# Patient Record
Sex: FEMALE | Race: OTHER | HISPANIC OR LATINO
[De-identification: names, ages, dates, MRNs, and addresses within clinical notes are randomized per-mention and may not be internally consistent; named-entity substitution may affect disease eponyms.]

---

## 2020-11-02 ENCOUNTER — ASOB RESULT (OUTPATIENT)
Age: 25
End: 2020-11-02

## 2020-11-02 ENCOUNTER — APPOINTMENT (OUTPATIENT)
Dept: ANTEPARTUM | Facility: CLINIC | Age: 25
End: 2020-11-02
Payer: MEDICAID

## 2020-11-02 PROBLEM — Z00.00 ENCOUNTER FOR PREVENTIVE HEALTH EXAMINATION: Status: ACTIVE | Noted: 2020-11-02

## 2020-11-02 PROCEDURE — 76801 OB US < 14 WKS SINGLE FETUS: CPT | Mod: 59

## 2020-11-06 ENCOUNTER — EMERGENCY (EMERGENCY)
Facility: HOSPITAL | Age: 25
LOS: 1 days | Discharge: HOME | End: 2020-11-06
Attending: EMERGENCY MEDICINE
Payer: MEDICAID

## 2020-11-06 VITALS
WEIGHT: 149.91 LBS | HEART RATE: 105 BPM | SYSTOLIC BLOOD PRESSURE: 113 MMHG | RESPIRATION RATE: 18 BRPM | HEIGHT: 72 IN | OXYGEN SATURATION: 98 % | DIASTOLIC BLOOD PRESSURE: 71 MMHG | TEMPERATURE: 99 F

## 2020-11-06 VITALS
RESPIRATION RATE: 20 BRPM | DIASTOLIC BLOOD PRESSURE: 74 MMHG | SYSTOLIC BLOOD PRESSURE: 110 MMHG | TEMPERATURE: 98 F | OXYGEN SATURATION: 97 % | HEART RATE: 98 BPM | HEIGHT: 72 IN

## 2020-11-06 LAB — S PYO DNA THROAT QL NAA+PROBE: DETECTED

## 2020-11-06 PROCEDURE — 87798 DETECT AGENT NOS DNA AMP: CPT

## 2020-11-06 PROCEDURE — 99283 EMERGENCY DEPT VISIT LOW MDM: CPT

## 2020-11-06 PROCEDURE — T1013: CPT

## 2020-11-06 PROCEDURE — 99284 EMERGENCY DEPT VISIT MOD MDM: CPT

## 2020-11-06 PROCEDURE — 87651 STREP A DNA AMP PROBE: CPT

## 2020-11-06 RX ORDER — ACETAMINOPHEN 500 MG
15 TABLET ORAL
Qty: 180 | Refills: 0
Start: 2020-11-06 | End: 2020-11-08

## 2020-11-06 RX ORDER — AMOXICILLIN 250 MG/5ML
875 SUSPENSION, RECONSTITUTED, ORAL (ML) ORAL ONCE
Refills: 0 | Status: COMPLETED | OUTPATIENT
Start: 2020-11-06 | End: 2020-11-06

## 2020-11-06 RX ORDER — ACETAMINOPHEN 500 MG
650 TABLET ORAL ONCE
Refills: 0 | Status: COMPLETED | OUTPATIENT
Start: 2020-11-06 | End: 2020-11-06

## 2020-11-06 RX ORDER — AMOXICILLIN 250 MG/5ML
1 SUSPENSION, RECONSTITUTED, ORAL (ML) ORAL
Qty: 20 | Refills: 0
Start: 2020-11-06 | End: 2020-11-15

## 2020-11-06 RX ORDER — AMOXICILLIN 250 MG/5ML
11 SUSPENSION, RECONSTITUTED, ORAL (ML) ORAL
Qty: 220 | Refills: 0
Start: 2020-11-06 | End: 2020-11-15

## 2020-11-06 RX ADMIN — Medication 875 MILLIGRAM(S): at 21:20

## 2020-11-06 RX ADMIN — Medication 650 MILLIGRAM(S): at 20:41

## 2020-11-06 NOTE — ED ADULT NURSE NOTE - NSIMPLEMENTINTERV_GEN_ALL_ED
stated Implemented All Universal Safety Interventions:  [place] to call system. Call bell, personal items and telephone within reach. Instruct patient to call for assistance. Room bathroom lighting operational. Non-slip footwear when patient is off stretcher. Physically safe environment: no spills, clutter or unnecessary equipment. Stretcher in lowest position, wheels locked, appropriate side rails in place.

## 2020-11-06 NOTE — ED STATDOCS - PATIENT PORTAL LINK FT
You can access the FollowMyHealth Patient Portal offered by [facility] by registering at the following website: [email or web address]. By joining [facility]’s FollowMyHealth portal, you will also be able to view your health information using other applications (apps) compatible with our system.

## 2020-11-06 NOTE — ED PROVIDER NOTE - NSFOLLOWUPINSTRUCTIONS_ED_ALL_ED_FT
Faringitis    La faringitis es la inflamación de la faringe, que suele ser causada por pedro infección viral o bacteriana. La faringitis puede ser contagiosa y puede propagarse de persona a persona a través del contacto íntimo, toser, estornudar o compartir artículos y utensilios personales. Los síntomas de la faringitis pueden incluir dolor de garganta, fiebre, dolor de nai o inflamación de los ganglios linfáticos. Si te recetan antibióticos, asegúrate de terminarlos incluso si empiezas a sentirte mejor. Haz gárgaras con agua salada tan a menudo blank cada 1-2 horas para calmar la garganta. Las pastillas para la garganta (si usted no está en riesgo de asfixia) o aerosoles se pueden utilizar para calmar la garganta.    BUSCA CUIDADO MEDICAL INMEDIATO SI TIENE CUALQUIERA DE LOS SIGUIENTES SYMPTOMS: rigidez del parvez, babeo, ronquera o cambio en la voz, incapacidad para tragar líquidos, vómitos o dificultad para respirar.    Curso completo completo de antibióticos según lo prescrito    James un seguimiento con lee médico principal en un plazo de 48 horas

## 2020-11-06 NOTE — ED ADULT NURSE NOTE - OBJECTIVE STATEMENT
c/o sore throat times three days, went to clinic no answers, presents with redness to the throat and pain with swallowing

## 2020-11-06 NOTE — ED ADULT TRIAGE NOTE - CHIEF COMPLAINT QUOTE
patient was seen here earlier today, is 11 weeks pregnant throat pain with left ear pain was given ABX still feeling the same

## 2020-11-06 NOTE — ED PROVIDER NOTE - PATIENT PORTAL LINK FT
You can access the FollowMyHealth Patient Portal offered by [facility] by registering at the following website: [email or web address]. By joining [facility]’s FollowMyHealth portal, you will also be able to view your health information using other applications (apps) compatible with our system.

## 2020-11-06 NOTE — ED STATDOCS - NSFOLLOWUPINSTRUCTIONS_ED_ALL_ED_FT
Tylenol extra strength 2 tablets every 4 hours for aches, pains, fevers or chills.  Keep well hydrated: drink lots of fluids including orange juice, tea with honey/ lemon tracey, and chicken broth. Rest.  Return immediately to the ER for re-examination if your symptoms are worsening. Otherwise, follow-up with your doctor in 2-3 days for re-evaluation.

## 2020-11-06 NOTE — ED PROVIDER NOTE - CLINICAL SUMMARY MEDICAL DECISION MAKING FREE TEXT BOX
25f with strep pharyngitis c/o throat pain. Pt started taking amoxicillin today and reports no improvement in symptoms. Uvula midline. Pt tolerating liquid PO intake. VSS. Will treat with PO liquid tylenol. Advised to complete full course of amoxicillin and follow up with PCP within 48 hours. Educated to return to ED if she is unable to tolerate PO intake, starts drooling, or develops difficulty breathing.

## 2020-11-06 NOTE — ED STATDOCS - OBJECTIVE STATEMENT
24 y/o female with PMHx of depression presents to ED c/o sore throat. Patient states 3 days with constant throat pain and right ear pain. Patient also c/o ha, and feels like vomiting when swallow. Patient states she felt warm last night. Denies: cough, abdominal pain, diarrhea, lost of taste or smell. 26 y/o female with PMHx of depression presents to ED c/o sore throat. Patient states 3 days with constant throat pain and right ear pain. Patient also c/o ha, and feels like vomiting when swallow. Patient states she felt warm last night. Denies: cough, abdominal pain, diarrhea, lost of taste or smell.  : Keisha 26 y/o female with PMHx of depression presents to ED c/o sore throat. Patient states 3 days with constant throat pain, difficulty swallowing, headache and right ear pain. Reports tactile fever last night. Denies: cough, abdominal pain, diarrhea, lost of taste or smell.  : Keisha

## 2020-11-06 NOTE — ED PROVIDER NOTE - OBJECTIVE STATEMENT
Pt is a 24 y/o f, denies PMH, currently 11 weeks gestation, presents to ED c/o sore throat x 3 days. Pt states she has been having pain with swallowing for the past 3 days that has persistently worsened. Pt was seen in [facility] ED earlier today and diagnosed with Strep pharyngitis and started on amoxicillin. Pt states she has been able to tolerate liquid PO intake but has severe pain with trying to tolerate solid food. Pt has been taking Tylenol with minimal relief of symptoms ( last dose 6:00am ). Pt has no other complaints at this time. Denies fevers, chills, belly pain, HA, dizziness, SOB, wheezing, urinary sx, sick contacts, and recent travel.  ED : Teresa Pt is a 26 y/o F, denies PMH, currently 11 weeks gestation, presents to ED c/o sore throat x 3 days. Pt states she has been having pain with swallowing for the past 3 days that has persistently worsened. Pt was seen in [facility] ED earlier today and diagnosed with Strep pharyngitis and started on amoxicillin. Pt states she has been able to tolerate liquid PO intake but has severe pain with trying to tolerate solid food. Pt has been taking Tylenol with minimal relief of symptoms ( last dose 6:00am ). Pt has no other complaints at this time. Denies fevers, chills, belly pain, HA, dizziness, SOB, wheezing, urinary sx, sick contacts, and recent travel.  ED : Teresa

## 2020-11-06 NOTE — ED STATDOCS - CLINICAL SUMMARY MEDICAL DECISION MAKING FREE TEXT BOX
pharyngitis, anticipatory guidance provided and supportive care. pharyngitis, rapid strep sent, anticipatory guidance provided and supportive care.

## 2021-01-06 ENCOUNTER — ASOB RESULT (OUTPATIENT)
Age: 26
End: 2021-01-06

## 2021-01-06 ENCOUNTER — APPOINTMENT (OUTPATIENT)
Dept: ANTEPARTUM | Facility: CLINIC | Age: 26
End: 2021-01-06
Payer: MEDICAID

## 2021-01-06 PROCEDURE — 76811 OB US DETAILED SNGL FETUS: CPT

## 2021-01-06 PROCEDURE — 99072 ADDL SUPL MATRL&STAF TM PHE: CPT

## 2021-03-17 ENCOUNTER — APPOINTMENT (OUTPATIENT)
Dept: ANTEPARTUM | Facility: CLINIC | Age: 26
End: 2021-03-17
Payer: MEDICAID

## 2021-03-17 ENCOUNTER — ASOB RESULT (OUTPATIENT)
Age: 26
End: 2021-03-17

## 2021-03-17 PROCEDURE — 76816 OB US FOLLOW-UP PER FETUS: CPT

## 2021-03-17 PROCEDURE — 99072 ADDL SUPL MATRL&STAF TM PHE: CPT

## 2021-04-28 ENCOUNTER — APPOINTMENT (OUTPATIENT)
Dept: ANTEPARTUM | Facility: CLINIC | Age: 26
End: 2021-04-28
Payer: MEDICAID

## 2021-04-28 ENCOUNTER — ASOB RESULT (OUTPATIENT)
Age: 26
End: 2021-04-28

## 2021-04-28 PROCEDURE — 99072 ADDL SUPL MATRL&STAF TM PHE: CPT

## 2021-04-28 PROCEDURE — 76819 FETAL BIOPHYS PROFIL W/O NST: CPT

## 2021-04-28 PROCEDURE — 76816 OB US FOLLOW-UP PER FETUS: CPT

## 2021-05-28 ENCOUNTER — INPATIENT (INPATIENT)
Facility: HOSPITAL | Age: 26
LOS: 2 days | Discharge: HOME | End: 2021-05-31
Attending: OBSTETRICS & GYNECOLOGY | Admitting: OBSTETRICS & GYNECOLOGY
Payer: MEDICAID

## 2021-05-28 VITALS
TEMPERATURE: 99 F | RESPIRATION RATE: 17 BRPM | WEIGHT: 210.1 LBS | HEART RATE: 109 BPM | SYSTOLIC BLOOD PRESSURE: 122 MMHG | DIASTOLIC BLOOD PRESSURE: 88 MMHG | HEIGHT: 63 IN

## 2021-05-28 DIAGNOSIS — O26.893 OTHER SPECIFIED PREGNANCY RELATED CONDITIONS, THIRD TRIMESTER: ICD-10-CM

## 2021-05-28 DIAGNOSIS — Z3A.40 40 WEEKS GESTATION OF PREGNANCY: ICD-10-CM

## 2021-05-28 LAB
BASOPHILS # BLD AUTO: 0.04 K/UL — SIGNIFICANT CHANGE UP (ref 0–0.2)
BASOPHILS NFR BLD AUTO: 0.3 % — SIGNIFICANT CHANGE UP (ref 0–2)
BLD GP AB SCN SERPL QL: SIGNIFICANT CHANGE UP
EOSINOPHIL # BLD AUTO: 0.13 K/UL — SIGNIFICANT CHANGE UP (ref 0–0.5)
EOSINOPHIL NFR BLD AUTO: 1.1 % — SIGNIFICANT CHANGE UP (ref 0–6)
HCT VFR BLD CALC: 40.4 % — SIGNIFICANT CHANGE UP (ref 34.5–45)
HGB BLD-MCNC: 14.1 G/DL — SIGNIFICANT CHANGE UP (ref 11.5–15.5)
IMM GRANULOCYTES NFR BLD AUTO: 0.5 % — SIGNIFICANT CHANGE UP (ref 0–1.5)
LYMPHOCYTES # BLD AUTO: 2.81 K/UL — SIGNIFICANT CHANGE UP (ref 1–3.3)
LYMPHOCYTES # BLD AUTO: 23.5 % — SIGNIFICANT CHANGE UP (ref 13–44)
MCHC RBC-ENTMCNC: 32.2 PG — SIGNIFICANT CHANGE UP (ref 27–34)
MCHC RBC-ENTMCNC: 34.9 GM/DL — SIGNIFICANT CHANGE UP (ref 32–36)
MCV RBC AUTO: 92.2 FL — SIGNIFICANT CHANGE UP (ref 80–100)
MONOCYTES # BLD AUTO: 0.6 K/UL — SIGNIFICANT CHANGE UP (ref 0–0.9)
MONOCYTES NFR BLD AUTO: 5 % — SIGNIFICANT CHANGE UP (ref 2–14)
NEUTROPHILS # BLD AUTO: 8.34 K/UL — HIGH (ref 1.8–7.4)
NEUTROPHILS NFR BLD AUTO: 69.6 % — SIGNIFICANT CHANGE UP (ref 43–77)
PLATELET # BLD AUTO: 264 K/UL — SIGNIFICANT CHANGE UP (ref 150–400)
RBC # BLD: 4.38 M/UL — SIGNIFICANT CHANGE UP (ref 3.8–5.2)
RBC # FLD: 12.6 % — SIGNIFICANT CHANGE UP (ref 10.3–14.5)
SARS-COV-2 RNA SPEC QL NAA+PROBE: SIGNIFICANT CHANGE UP
WBC # BLD: 11.98 K/UL — HIGH (ref 3.8–10.5)
WBC # FLD AUTO: 11.98 K/UL — HIGH (ref 3.8–10.5)

## 2021-05-28 RX ORDER — SODIUM CHLORIDE 9 MG/ML
1000 INJECTION, SOLUTION INTRAVENOUS
Refills: 0 | Status: DISCONTINUED | OUTPATIENT
Start: 2021-05-28 | End: 2021-05-29

## 2021-05-28 RX ORDER — OXYTOCIN 10 UNIT/ML
333.33 VIAL (ML) INJECTION
Qty: 20 | Refills: 0 | Status: DISCONTINUED | OUTPATIENT
Start: 2021-05-28 | End: 2021-05-31

## 2021-05-28 RX ORDER — CITRIC ACID/SODIUM CITRATE 300-500 MG
30 SOLUTION, ORAL ORAL ONCE
Refills: 0 | Status: DISCONTINUED | OUTPATIENT
Start: 2021-05-28 | End: 2021-05-29

## 2021-05-28 RX ORDER — OXYTOCIN 10 UNIT/ML
2 VIAL (ML) INJECTION
Qty: 30 | Refills: 0 | Status: DISCONTINUED | OUTPATIENT
Start: 2021-05-28 | End: 2021-05-31

## 2021-05-28 RX ADMIN — Medication 2 MILLIUNIT(S)/MIN: at 22:35

## 2021-05-28 RX ADMIN — SODIUM CHLORIDE 125 MILLILITER(S): 9 INJECTION, SOLUTION INTRAVENOUS at 18:50

## 2021-05-28 NOTE — OB PROVIDER H&P - PMH
Depression    Termination of pregnancy (fetus)  x 1 (2016)  Vaginal delivery  x3 (2011, 2014, 2016)

## 2021-05-28 NOTE — OB PROVIDER H&P - NSLASTDATEOFPRENATALVISIT_OBGYN_ALL_OB_DT
CHIEF COMPLAINT    Chief Complaint   Patient presents with   • Back Pain       HPI    5:50 PM Maggy Lake is a 24 year old female with hx chronic back pain presents to the ED c/o acute worsening of her chronic lower back pain with radiates down her left leg x3 days.  Patient doesn't remember a specific event that caused her pain to increase. Pt does see Dr. Foreman, PM, for her back pain. She gets injections for the pain and her last injection was in the fall. She states it has been a long time since it has been this bad. She has tried Tylenol, Ibuprofen 800mg and Tizanidine with minimal relief. She denies bowel or bladder incontinence. She denies a hx DM. She has no other concerns at this time.     Allergies    ALLERGIES:   Allergen Reactions   • Risperidone Other (See Comments)     galactorrhea   • Tramadol PRURITUS       Current Medications   Discharge Medication List as of 5/14/2017  5:58 PM      CONTINUE these medications which have NOT CHANGED    Details   tiZANidine (ZANAFLEX) 4 MG tablet Take 4 mg by mouth every 6 hours as needed.Historical Med      ibuprofen (MOTRIN) 800 MG tablet Take 1 tablet by mouth 3 times daily as needed for Pain.Eprescribe, Disp-30 tablet, R-0      acetaminophen (TYLENOL) 325 MG tablet Take 325 mg by mouth every 4 hours as needed for Pain.Historical Med      HYDROcodone-acetaminophen (NORCO) 5-325 MG per tablet Take 1 tablet by mouth every 6 hours as needed for Pain.Normal, Disp-15 tablet, R-0      Lurasidone HCl (LATUDA) 60 MG Tab Take 60 mg by mouth daily.Eprescribe, Disp-30 tablet, R-1      albuterol 108 (90 BASE) MCG/ACT inhaler Inhale 1 puff into the lungs every 4 hours as needed for Wheezing.Eprescribe, Disp-1 Inhaler, R-12             Past Medical History    Past Medical History:   Diagnosis Date   • Anxiety    • Bipolar affective disorder (CMS/HCC)    • Depression    • HPV in female    • Obesity    • RAD (reactive airway disease)        Surgical History    Past Surgical  History:   Procedure Laterality Date   • D AND C  2010   • LAPAROSCOPIC SALPINGOSTOMY Bilateral 12/20/2016   • PILONIDAL CYST DRAINAGE  ~2012   • URETER SURGERY      x3+ times       Social History    Social History     Social History   • Marital status: Single     Spouse name: J Carlos Wang   • Number of children: 3   • Years of education: N/A     Occupational History   • Cleaning jobs x 2      homes/businesses     Social History Main Topics   • Smoking status: Current Every Day Smoker     Packs/day: 0.25     Years: 5.00     Types: Cigarettes   • Smokeless tobacco: Never Used      Comment: Refuses quitline 9-21-16   • Alcohol use No   • Drug use: No   • Sexual activity: No     Other Topics Concern   • Caffeine Concern No     1 cup coffee daily   • Exercise No     Not Regular     Social History Narrative       Family History    Family History   Problem Relation Age of Onset   • Diabetes Maternal Grandmother        REVIEW OF SYSTEMS    ALL 13 SYSTEMS REVIEWED AND NEGATIVE OR NONCONTRIBUTORY UNLESS OTHERWISE NOTED IN HPI      PHYSICAL EXAM       ED Triage Vitals   ED Triage Vitals Group      Temp 05/14/17 1732 99.2 °F (37.3 °C)      Pulse 05/14/17 1732 96      Resp 05/14/17 1732 18      BP 05/14/17 1732 121/75      SpO2 05/14/17 1732 98 %      EtCO2 mmHg --       Height 05/14/17 1732 5' 9\" (1.753 m)      Weight 05/14/17 1732 270 lb (122.5 kg)      Weight Scale Used --      Gen:   AAOx3 in NAD.  Moderate distress.  Head:  Normocephalic, without abnormal findings  HEENT:   PERRL,   Neck: No masses, thyromegaly, posterior tenderness or meningeal signs  Resp: No respiratory distress  ABD: Nontender, No masses or organomegaly  Back: No CVA tenderness, deformities, swelling or ecchymosis.    TTP to left lower lumbar with muscle spasm. No midline tenderness  Ext:  No clubbing, cyanosis, or significant edema.  Equal UE/LE pulses. No joint swelling or erythema  Skin:  Well perfused, no significant rashes. No jaundice  Neuro:  No focal Neuro deficits with equal UE/LE strength and sensation.  DTR 2+ and equal rian.  Normal dorsiflexion/plantar flexion with normal EHL/FHL strength.  No sensory deficit.  Lymph:No significant Lymphadenopathy  Psych: Alert and interactive with normal affect and interaction.          Procedures      MDM    Upon initial visit we discussed the plan for an injection for pain control. We also discussed the plan for discharge with prescriptions for diclofenac and a medrol dosepak. She is to call Dr. Foreman's office in the morning and follow up with him. She agrees with this plan. All questions and concerns were addressed at this time.     Medications   HYDROmorphone (DILAUDID) PF injection 1 mg (1 mg Intramuscular Given 5/14/17 1810)   ketorolac injection 60 mg (60 mg Intramuscular Given 5/14/17 1809)   HYDROmorphone (DILAUDID) PF injection 1 mg (1 mg Intramuscular Given 5/14/17 1920)         Diagnosis:  ED Diagnosis        Final diagnosis    Left lumbar radiculopathy             Discharge Medication List as of 5/14/2017  5:58 PM      START taking these medications    Details   methylPREDNISolone (MEDROL DOSEPAK) 4 MG tablet follow package directionsNormal, Disp-21 tablet, R-0      diclofenac (VOLTAREN) 75 MG EC tablet Take 1 tablet by mouth 2 times daily.Normal, Disp-20 tablet, R-0             Follow Up:  Lucio Foreman MD  [address] [address]  [phone]    In 1 day       Patient was instructed to return to the ED immediately if symptoms worsen or any new unusual symptoms arise.     Manolo Burleson MD     Recheck on patient. Discussed with patient ED findings and plan for discharge. Patient was given ED warnings, discharge instructions, and follow up information to go home with. Patient understands and agrees with plan for discharge. Any questions have been answered.      Closure:  The patient understands that this is a provisional diagnosis. Provisional diagnosis can and do change.  The diagnosis that you are discharged with today is based on the symptoms with which you presented today. If any new symptoms occur or worsen, you should seek immediate attention for re-evaluation.  Any symptoms that persist or fail to completely resolve require further evaluation by your other healthcare provider(s).      This chart was documented by Arminda Das acting as a scribe for Manolo Burleson MD, 5/14/2017, 5:49 PM.  The documentation recorded by the scribe accurately and completely reflects the service(s) I personally performed and the decisions made by me.          Manolo Burleson MD  05/14/17 5910     28-May-2021

## 2021-05-28 NOTE — OB PROVIDER H&P - NSHPPHYSICALEXAM_GEN_ALL_CORE
Vital Signs Last 24 Hrs  T(C): 37 (28 May 2021 18:18), Max: 37 (28 May 2021 18:18)  T(F): 98.6 (28 May 2021 18:18), Max: 98.6 (28 May 2021 18:18)  HR: 109 (28 May 2021 18:25) (109 - 109)  BP: 122/88 (28 May 2021 18:25) (122/88 - 122/88)  RR: 17 (28 May 2021 18:18) (17 - 17)    CV: RRR   Lungs: CTA   Abdomen: gravid, soft, nontender

## 2021-05-28 NOTE — OB RN PATIENT PROFILE - PMH
Depression    Vaginal delivery  x3 (2011, 2014, 2016)   Depression    Termination of pregnancy (fetus)  x 1 (2016)  Vaginal delivery  x3 (2011, 2014, 2016)

## 2021-05-28 NOTE — OB PROVIDER H&P - HISTORY OF PRESENT ILLNESS
Patient is a 24yo  at 40 5/7 weeks consistent with TORO 2021 who was sent to L&D for nonreactive NST in [facility] clinic today (2 variable decelerations, moderate variability, and accelerations were present). She had intermittent contractions. No leakage of fluid or vaginal bleeding. +FM     Prenatal course otherwise uncomplicated. Desires postpartum sterilization     OBHx:   G1   7lbs   G2   8lbs   G3 2016  8lbs 2oz   G4 SAB   G5 current

## 2021-05-28 NOTE — OB PROVIDER H&P - ASSESSMENT
Patient is a 26yo  at 40 5/7 weeks consistent with TORO 2021 who was sent to L&D for IOL for nonreactive NST     Plan:   [] FHT category 1   [] Ctx irregular   [] GBS negative   [] Admission labs pending, including COVID swab   [] Will reexamine and determine IOL method, likely AROM +/- pitocin

## 2021-05-28 NOTE — OB PROVIDER LABOR PROGRESS NOTE - ASSESSMENT
Jeffry is a 24yo  at 40 weeks     Plan:   [] FHT category 1   [] ctx irregular   [] Cervix unchanged despite AROM and more regular contractions Will start pitocin.     Dr. Simmons in agreement with plan 
Jeffry is a 24yo  at 40+ weeks here for IOL for nonreactive NST     [] FHT category 1   [] ctx irregular   [] AROM clear fluid   [] Will augment with pitocin if ctx still inadequate after AROM     Dr. Simmons in agreement with plan

## 2021-05-28 NOTE — OB RN PATIENT PROFILE - NS PRO DEPRESSION SCREENING Y/N1
Assumed care of patient at 1900.  used for assessment. A/O x4, denies pain or needs at this time. Discussed plan of care and call light use.    no

## 2021-05-29 ENCOUNTER — TRANSCRIPTION ENCOUNTER (OUTPATIENT)
Age: 26
End: 2021-05-29

## 2021-05-29 LAB
BASOPHILS # BLD AUTO: 0.04 K/UL — SIGNIFICANT CHANGE UP (ref 0–0.2)
BASOPHILS NFR BLD AUTO: 0.3 % — SIGNIFICANT CHANGE UP (ref 0–2)
COVID-19 SPIKE DOMAIN AB INTERP: POSITIVE
COVID-19 SPIKE DOMAIN AB INTERP: POSITIVE
COVID-19 SPIKE DOMAIN ANTIBODY RESULT: 114 U/ML — HIGH
COVID-19 SPIKE DOMAIN ANTIBODY RESULT: 133 U/ML — HIGH
EOSINOPHIL # BLD AUTO: 0.02 K/UL — SIGNIFICANT CHANGE UP (ref 0–0.5)
EOSINOPHIL NFR BLD AUTO: 0.1 % — SIGNIFICANT CHANGE UP (ref 0–6)
HCT VFR BLD CALC: 34.4 % — LOW (ref 34.5–45)
HGB BLD-MCNC: 12 G/DL — SIGNIFICANT CHANGE UP (ref 11.5–15.5)
IMM GRANULOCYTES NFR BLD AUTO: 0.4 % — SIGNIFICANT CHANGE UP (ref 0–1.5)
LYMPHOCYTES # BLD AUTO: 13.4 % — SIGNIFICANT CHANGE UP (ref 13–44)
LYMPHOCYTES # BLD AUTO: 2.12 K/UL — SIGNIFICANT CHANGE UP (ref 1–3.3)
MCHC RBC-ENTMCNC: 32.5 PG — SIGNIFICANT CHANGE UP (ref 27–34)
MCHC RBC-ENTMCNC: 34.9 GM/DL — SIGNIFICANT CHANGE UP (ref 32–36)
MCV RBC AUTO: 93.2 FL — SIGNIFICANT CHANGE UP (ref 80–100)
MEV IGG SER-ACNC: 87.3 AU/ML — SIGNIFICANT CHANGE UP
MEV IGG+IGM SER-IMP: POSITIVE — SIGNIFICANT CHANGE UP
MONOCYTES # BLD AUTO: 0.78 K/UL — SIGNIFICANT CHANGE UP (ref 0–0.9)
MONOCYTES NFR BLD AUTO: 4.9 % — SIGNIFICANT CHANGE UP (ref 2–14)
NEUTROPHILS # BLD AUTO: 12.8 K/UL — HIGH (ref 1.8–7.4)
NEUTROPHILS NFR BLD AUTO: 80.9 % — HIGH (ref 43–77)
PLATELET # BLD AUTO: 240 K/UL — SIGNIFICANT CHANGE UP (ref 150–400)
RBC # BLD: 3.69 M/UL — LOW (ref 3.8–5.2)
RBC # FLD: 12.5 % — SIGNIFICANT CHANGE UP (ref 10.3–14.5)
SARS-COV-2 IGG+IGM SERPL QL IA: 114 U/ML — HIGH
SARS-COV-2 IGG+IGM SERPL QL IA: 133 U/ML — HIGH
SARS-COV-2 IGG+IGM SERPL QL IA: POSITIVE
SARS-COV-2 IGG+IGM SERPL QL IA: POSITIVE
T PALLIDUM AB TITR SER: NEGATIVE — SIGNIFICANT CHANGE UP
WBC # BLD: 15.83 K/UL — HIGH (ref 3.8–10.5)
WBC # FLD AUTO: 15.83 K/UL — HIGH (ref 3.8–10.5)

## 2021-05-29 RX ORDER — DIPHENHYDRAMINE HCL 50 MG
25 CAPSULE ORAL EVERY 6 HOURS
Refills: 0 | Status: DISCONTINUED | OUTPATIENT
Start: 2021-05-29 | End: 2021-05-31

## 2021-05-29 RX ORDER — IBUPROFEN 200 MG
1 TABLET ORAL
Qty: 30 | Refills: 0
Start: 2021-05-29

## 2021-05-29 RX ORDER — SODIUM CHLORIDE 9 MG/ML
1000 INJECTION, SOLUTION INTRAVENOUS
Refills: 0 | Status: DISCONTINUED | OUTPATIENT
Start: 2021-05-30 | End: 2021-05-31

## 2021-05-29 RX ORDER — OXYCODONE HYDROCHLORIDE 5 MG/1
5 TABLET ORAL ONCE
Refills: 0 | Status: DISCONTINUED | OUTPATIENT
Start: 2021-05-29 | End: 2021-05-31

## 2021-05-29 RX ORDER — IBUPROFEN 200 MG
600 TABLET ORAL EVERY 6 HOURS
Refills: 0 | Status: COMPLETED | OUTPATIENT
Start: 2021-05-29 | End: 2022-04-27

## 2021-05-29 RX ORDER — BENZOCAINE 10 %
1 GEL (GRAM) MUCOUS MEMBRANE EVERY 6 HOURS
Refills: 0 | Status: DISCONTINUED | OUTPATIENT
Start: 2021-05-29 | End: 2021-05-31

## 2021-05-29 RX ORDER — TETANUS TOXOID, REDUCED DIPHTHERIA TOXOID AND ACELLULAR PERTUSSIS VACCINE, ADSORBED 5; 2.5; 8; 8; 2.5 [IU]/.5ML; [IU]/.5ML; UG/.5ML; UG/.5ML; UG/.5ML
0.5 SUSPENSION INTRAMUSCULAR ONCE
Refills: 0 | Status: DISCONTINUED | OUTPATIENT
Start: 2021-05-29 | End: 2021-05-31

## 2021-05-29 RX ORDER — ACETAMINOPHEN 500 MG
975 TABLET ORAL
Refills: 0 | Status: DISCONTINUED | OUTPATIENT
Start: 2021-05-29 | End: 2021-05-31

## 2021-05-29 RX ORDER — OXYTOCIN 10 UNIT/ML
333.33 VIAL (ML) INJECTION
Qty: 20 | Refills: 0 | Status: DISCONTINUED | OUTPATIENT
Start: 2021-05-29 | End: 2021-05-31

## 2021-05-29 RX ORDER — OXYCODONE HYDROCHLORIDE 5 MG/1
5 TABLET ORAL
Refills: 0 | Status: DISCONTINUED | OUTPATIENT
Start: 2021-05-29 | End: 2021-05-31

## 2021-05-29 RX ORDER — SIMETHICONE 80 MG/1
80 TABLET, CHEWABLE ORAL EVERY 4 HOURS
Refills: 0 | Status: DISCONTINUED | OUTPATIENT
Start: 2021-05-29 | End: 2021-05-31

## 2021-05-29 RX ORDER — KETOROLAC TROMETHAMINE 30 MG/ML
30 SYRINGE (ML) INJECTION ONCE
Refills: 0 | Status: DISCONTINUED | OUTPATIENT
Start: 2021-05-29 | End: 2021-05-29

## 2021-05-29 RX ORDER — LANOLIN
1 OINTMENT (GRAM) TOPICAL EVERY 6 HOURS
Refills: 0 | Status: DISCONTINUED | OUTPATIENT
Start: 2021-05-29 | End: 2021-05-31

## 2021-05-29 RX ORDER — HYDROCORTISONE 1 %
1 OINTMENT (GRAM) TOPICAL EVERY 6 HOURS
Refills: 0 | Status: DISCONTINUED | OUTPATIENT
Start: 2021-05-29 | End: 2021-05-31

## 2021-05-29 RX ORDER — PRAMOXINE HYDROCHLORIDE 150 MG/15G
1 AEROSOL, FOAM RECTAL EVERY 4 HOURS
Refills: 0 | Status: DISCONTINUED | OUTPATIENT
Start: 2021-05-29 | End: 2021-05-31

## 2021-05-29 RX ORDER — IBUPROFEN 200 MG
600 TABLET ORAL EVERY 6 HOURS
Refills: 0 | Status: DISCONTINUED | OUTPATIENT
Start: 2021-05-29 | End: 2021-05-31

## 2021-05-29 RX ORDER — SODIUM CHLORIDE 9 MG/ML
1000 INJECTION, SOLUTION INTRAVENOUS ONCE
Refills: 0 | Status: COMPLETED | OUTPATIENT
Start: 2021-05-29 | End: 2021-05-29

## 2021-05-29 RX ORDER — MAGNESIUM HYDROXIDE 400 MG/1
30 TABLET, CHEWABLE ORAL
Refills: 0 | Status: DISCONTINUED | OUTPATIENT
Start: 2021-05-29 | End: 2021-05-31

## 2021-05-29 RX ORDER — SODIUM CHLORIDE 9 MG/ML
3 INJECTION INTRAMUSCULAR; INTRAVENOUS; SUBCUTANEOUS EVERY 8 HOURS
Refills: 0 | Status: DISCONTINUED | OUTPATIENT
Start: 2021-05-29 | End: 2021-05-31

## 2021-05-29 RX ORDER — AER TRAVELER 0.5 G/1
1 SOLUTION RECTAL; TOPICAL EVERY 4 HOURS
Refills: 0 | Status: DISCONTINUED | OUTPATIENT
Start: 2021-05-29 | End: 2021-05-31

## 2021-05-29 RX ORDER — DIBUCAINE 1 %
1 OINTMENT (GRAM) RECTAL EVERY 6 HOURS
Refills: 0 | Status: DISCONTINUED | OUTPATIENT
Start: 2021-05-29 | End: 2021-05-31

## 2021-05-29 RX ADMIN — Medication 600 MILLIGRAM(S): at 17:45

## 2021-05-29 RX ADMIN — Medication 600 MILLIGRAM(S): at 18:40

## 2021-05-29 RX ADMIN — Medication 30 MILLIGRAM(S): at 01:35

## 2021-05-29 RX ADMIN — Medication 1 TABLET(S): at 12:02

## 2021-05-29 RX ADMIN — Medication 1000 MILLIUNIT(S)/MIN: at 01:17

## 2021-05-29 RX ADMIN — Medication 600 MILLIGRAM(S): at 12:03

## 2021-05-29 RX ADMIN — Medication 0.2 MILLIGRAM(S): at 05:58

## 2021-05-29 RX ADMIN — Medication 600 MILLIGRAM(S): at 12:52

## 2021-05-29 RX ADMIN — SODIUM CHLORIDE 1000 MILLILITER(S): 9 INJECTION, SOLUTION INTRAVENOUS at 02:18

## 2021-05-29 RX ADMIN — Medication 0.2 MILLIGRAM(S): at 12:02

## 2021-05-29 RX ADMIN — Medication 0.2 MILLIGRAM(S): at 17:45

## 2021-05-29 RX ADMIN — SODIUM CHLORIDE 3 MILLILITER(S): 9 INJECTION INTRAMUSCULAR; INTRAVENOUS; SUBCUTANEOUS at 05:58

## 2021-05-29 RX ADMIN — Medication 30 MILLIGRAM(S): at 02:47

## 2021-05-29 NOTE — DISCHARGE NOTE OB - CARE PROVIDER_API CALL
[facility],   [address]  [facility]  [address]  Phone: [phone]  Fax: (   )    -  Follow Up Time:

## 2021-05-29 NOTE — DISCHARGE NOTE OB - MEDICATION SUMMARY - MEDICATIONS TO STOP TAKING
I will STOP taking the medications listed below when I get home from the hospital:    amoxicillin 875 mg oral tablet  -- 1 tab(s) by mouth every 12 hours   -- Finish all this medication unless otherwise directed by prescriber.    amoxicillin 400 mg/5 mL oral liquid  -- 11 milliliter(s) by mouth every 12 hours   -- Expires___________________  Finish all this medication unless otherwise directed by prescriber.  Refrigerate and shake well.  Expires_______________________

## 2021-05-29 NOTE — OB PROVIDER DELIVERY SUMMARY - NSPROVIDERDELIVERYNOTE_OBGYN_ALL_OB_FT
Patient felt rectal pressure and pushed effectively for approximately 30 minutes prior to delivering viable male infant over intact perineum. Nuchal cord x1 reduced after delivery of fetal head. Shoulders delivered atraumatically followed by remaining fetal body. Cord clamped and cut. Gases obtained. Placenta delivered intact and spontaneously. Pitocin started. Perineum inspected and noted to be intact. Pt tolerated well. QBL 120cc.

## 2021-05-29 NOTE — DISCHARGE NOTE OB - PROVIDER TOKENS
FREE:[LAST:[[facility]],PHONE:[phone]],FAX:[(   )    -],ADDRESS:[[address] [place] [address]]]

## 2021-05-29 NOTE — OB RN DELIVERY SUMMARY - NSSELHIDDEN_OBGYN_ALL_OB_FT
[NS_DeliveryAttending1_OBGYN_ALL_OB_FT:[id number]],[NS_DeliveryRN_OBGYN_ALL_OB_FT:[id number]==],[NS_CirculateRN2_OBGYN_ALL_OB_FT:[id number]]

## 2021-05-29 NOTE — OB RN DELIVERY SUMMARY - NS_SEPSISRSKCALC_OBGYN_ALL_OB_FT
EOS calculated successfully. EOS Risk Factor: 0.5/1000 live births ([facility] national incidence); GA=40w6d; Temp=98.6; ROM=4.5; GBS='Negative'; Antibiotics='No antibiotics or any antibiotics < 2 hrs prior to birth'

## 2021-05-29 NOTE — DISCHARGE NOTE OB - CARE PLAN
Principal Discharge DX:	Vaginal delivery  Goal:	Rapid recovery  Assessment and plan of treatment:	Patient should transition to regular activity level. Resume regular diet. Patient should follow up with her OB for a postpartum checkup 4-6 weeks after delivery. Patient should call her doctor sooner if she develops a fever or uncontrolled vaginal bleeding or fevers. Please call sooner if there are any other concerns.

## 2021-05-29 NOTE — DISCHARGE NOTE OB - PATIENT PORTAL LINK FT
You can access the FollowMyHealth Patient Portal offered by [facility] by registering at the following website: [email or web address]. By joining "[employer]’s FollowMyHealth portal, you will also be able to view your health information using other applications (apps) compatible with our system.

## 2021-05-29 NOTE — DISCHARGE NOTE OB - PLAN OF CARE
Rapid recovery Patient should transition to regular activity level. Resume regular diet. Patient should follow up with her OB for a postpartum checkup 4-6 weeks after delivery. Patient should call her doctor sooner if she develops a fever or uncontrolled vaginal bleeding or fevers. Please call sooner if there are any other concerns.

## 2021-05-29 NOTE — DISCHARGE NOTE OB - MEDICATION SUMMARY - MEDICATIONS TO TAKE
I will START or STAY ON the medications listed below when I get home from the hospital:    ibuprofen 600 mg oral tablet  -- 1 tab(s) by mouth every 6 hours, As Needed -for moderate pain  -- Do not take this drug if you are pregnant.  It is very important that you take or use this exactly as directed.  Do not skip doses or discontinue unless directed by your doctor.  May cause drowsiness or dizziness.  Obtain medical advice before taking any non-prescription drugs as some may affect the action of this medication.  Take with food or milk.    -- Indication: For pain

## 2021-05-29 NOTE — DISCHARGE NOTE OB - HOSPITAL COURSE
She is a   who presented for an induction of labor and had a normal delivery. She had a normal postpartum course and was discharged home in stable condition on postpartum day 2.  She is a   who presented for an induction of labor and had a normal delivery. She had a post partum tubal on PPD 1 and was discharged home in stable condition on postpartum day 2.

## 2021-05-30 ENCOUNTER — RESULT REVIEW (OUTPATIENT)
Age: 26
End: 2021-05-30

## 2021-05-30 LAB
HCT VFR BLD CALC: 34.8 % — SIGNIFICANT CHANGE UP (ref 34.5–45)
HCT VFR BLD CALC: 35.2 % — SIGNIFICANT CHANGE UP (ref 34.5–45)
HGB BLD-MCNC: 11.9 G/DL — SIGNIFICANT CHANGE UP (ref 11.5–15.5)
HGB BLD-MCNC: 12.2 G/DL — SIGNIFICANT CHANGE UP (ref 11.5–15.5)
MCHC RBC-ENTMCNC: 32.9 PG — SIGNIFICANT CHANGE UP (ref 27–34)
MCHC RBC-ENTMCNC: 35.1 GM/DL — SIGNIFICANT CHANGE UP (ref 32–36)
MCV RBC AUTO: 93.8 FL — SIGNIFICANT CHANGE UP (ref 80–100)
PLATELET # BLD AUTO: 259 K/UL — SIGNIFICANT CHANGE UP (ref 150–400)
RBC # BLD: 3.71 M/UL — LOW (ref 3.8–5.2)
RBC # FLD: 13 % — SIGNIFICANT CHANGE UP (ref 10.3–14.5)
WBC # BLD: 8.06 K/UL — SIGNIFICANT CHANGE UP (ref 3.8–10.5)
WBC # FLD AUTO: 8.06 K/UL — SIGNIFICANT CHANGE UP (ref 3.8–10.5)

## 2021-05-30 PROCEDURE — 88302 TISSUE EXAM BY PATHOLOGIST: CPT | Mod: 26

## 2021-05-30 RX ADMIN — SODIUM CHLORIDE 3 MILLILITER(S): 9 INJECTION INTRAMUSCULAR; INTRAVENOUS; SUBCUTANEOUS at 16:38

## 2021-05-30 RX ADMIN — Medication 975 MILLIGRAM(S): at 21:29

## 2021-05-30 RX ADMIN — Medication 975 MILLIGRAM(S): at 16:37

## 2021-05-30 RX ADMIN — Medication 975 MILLIGRAM(S): at 17:07

## 2021-05-30 RX ADMIN — Medication 975 MILLIGRAM(S): at 22:20

## 2021-05-30 NOTE — OB RN INTRAOPERATIVE NOTE - NSSELHIDDEN_OBGYN_ALL_OB_FT
[NS_DeliveryAttending1_OBGYN_ALL_OB_FT:[id number]],[NS_DeliveryRN_OBGYN_ALL_OB_FT:[id number]],[NS_CirculateRN2_OBGYN_ALL_OB_FT:[id number]]

## 2021-05-30 NOTE — OB RN INTRAOPERATIVE NOTE - NSOBSELHIDDEN_OBGYN_ALL_OB_FT
[NSOBAttendingProcedure1_OBGYN_ALL_OB_FT:[id number]],[NSRNCirculatorProcedure1_OBGYN_ALL_OB_FT:[id number]]

## 2021-05-31 VITALS
DIASTOLIC BLOOD PRESSURE: 67 MMHG | RESPIRATION RATE: 18 BRPM | SYSTOLIC BLOOD PRESSURE: 109 MMHG | OXYGEN SATURATION: 98 % | TEMPERATURE: 98 F | HEART RATE: 71 BPM

## 2021-05-31 RX ADMIN — Medication 975 MILLIGRAM(S): at 10:30

## 2021-05-31 RX ADMIN — Medication 975 MILLIGRAM(S): at 09:38

## 2021-05-31 NOTE — PROGRESS NOTE ADULT - SUBJECTIVE AND OBJECTIVE BOX
Patient is a 25y woman  ; PPD# 1from  complicated by stable hemorrhage with 598 clot passed after delivery.     Subjective:  - The patient seen and examined at bedside. No acute overnight events.   - she still desires a tubal.   - She feels well, pain is well controlled.   - She is ambulating. currently NPO for tubal  - +Flatus, - BM. Patient is voiding without difficulty.   - She denies nausea/vomiting, breathing problems, headache and visual changes.  - Lochia wnl.  - milk hasnt' come in. Bottle feeding at this time    Vital Signs Last 24 Hrs  T(C): 36.6 (30 May 2021 04:36), Max: 36.6 (30 May 2021 04:36)  T(F): 97.8 (30 May 2021 04:36), Max: 97.8 (30 May 2021 04:36)  HR: 77 (30 May 2021 04:36) (77 - 77)  BP: 110/70 (30 May 2021 04:36) (110/70 - 110/70)  BP(mean): --  RR: 20 (30 May 2021 04:36) (20 - 20)  SpO2: 98% (30 May 2021 04:36) (98% - 98%)    Physical exam:  General: NAD. Appears well.  Abdomen: soft, nontender, nondistended, firm uterine fundus, umbilical piercing in superior aspect  Pelvic: Normal lochia noted.  Ext: No DVT signs, warm extremities, no edema.    Allergies    No Known Allergies    Intolerances        LABS:                        12.0   15.83 )-----------( 240      ( 29 May 2021 06:05 )             34.4                 
JULIEN MIDDLETON is a 25y  now PPD#2 s/p spontaneous vaginal delivery at 40+ weeks gestation significant for stable code PPH s/p methergine series and cytotec [place]. POD#1 from postpartum bilateral salpingectomy.     S:    The patient has no complaints.  Pain controlled with current treatment regimen.   She is ambulating without difficulty and tolerating PO.   + flatus/-BM/+ voiding   She endorses appropriate lochia, which is decreasing.   She is breastfeeding and formula feeding without difficulty.   Patient denies lightheadedness, dizziness, palpitations, shortness of breath and chest pain.   Patient desires to go home.     O:    T(C): 36.6 (21 @ 04:20), Max: 37 (21 @ 17:00)  HR: 71 (21 @ 04:20) (60 - 87)  BP: 109/67 (21 @ 04:20) (108/72 - 124/72)  RR: 18 (21 @ 04:20) (12 - 20)  SpO2: 98% (21 @ 04:20) (98% - 100%)    Gen: NAD, AOx3  CV: RRR  Pulm: CTAB  Breast: Nontender, non-engorged   Abdomen:  Soft, non-tender, non-distended, +bowel sounds  Uterus:  Fundus firm below umbilicus  Incision: clean/dry/intact with steri strips in place  VE:  +Lochia  Ext:  Non-tender and non-edematous                          12.2   8.06  )-----------( 259      ( 30 May 2021 12:08 )             34.8

## 2021-05-31 NOTE — PROGRESS NOTE ADULT - ASSESSMENT
A/P:  25y  now PPD#2 s/p spontaneous vaginal delivery at 40+ weeks gestation significant for stable code PPH s/p methergine series and cytotec [place]. POD#1 from postpartum bilateral salpingectomy.   -Vital signs stable  -Hgb: 11.9 -> 12.2  -Voiding, tolerating PO, bowel function nml   -Advance care as tolerated   -Continue routine postpartum care and education  -Healthy male infant, circumcision completed   -Dispo: Pt is stable for discharge to home pending attending approval.
25y yo   s/p vaginal delivery PPD1. Stable. No current complaints.  - NPO for tubal today  - Hg 14.1->12, recheck this AM  - Out of bed. Aggressive ambulation.  - Analgesia PRN

## 2021-06-07 LAB — SURGICAL PATHOLOGY STUDY: SIGNIFICANT CHANGE UP

## 2021-06-22 PROCEDURE — 86850 RBC ANTIBODY SCREEN: CPT

## 2021-06-22 PROCEDURE — 36415 COLL VENOUS BLD VENIPUNCTURE: CPT

## 2021-06-22 PROCEDURE — 85014 HEMATOCRIT: CPT

## 2021-06-22 PROCEDURE — 86900 BLOOD TYPING SEROLOGIC ABO: CPT

## 2021-06-22 PROCEDURE — 85018 HEMOGLOBIN: CPT

## 2021-06-22 PROCEDURE — 85027 COMPLETE CBC AUTOMATED: CPT

## 2021-06-22 PROCEDURE — 59050 FETAL MONITOR W/REPORT: CPT

## 2021-06-22 PROCEDURE — 86765 RUBEOLA ANTIBODY: CPT

## 2021-06-22 PROCEDURE — 85025 COMPLETE CBC W/AUTO DIFF WBC: CPT

## 2021-06-22 PROCEDURE — G0463: CPT

## 2021-06-22 PROCEDURE — 86769 SARS-COV-2 COVID-19 ANTIBODY: CPT

## 2021-06-22 PROCEDURE — 59025 FETAL NON-STRESS TEST: CPT

## 2021-06-22 PROCEDURE — 86780 TREPONEMA PALLIDUM: CPT

## 2021-06-22 PROCEDURE — 87635 SARS-COV-2 COVID-19 AMP PRB: CPT

## 2021-06-22 PROCEDURE — 88302 TISSUE EXAM BY PATHOLOGIST: CPT

## 2021-06-22 PROCEDURE — 86901 BLOOD TYPING SEROLOGIC RH(D): CPT

## 2021-10-16 NOTE — CHART NOTE - NSCHARTNOTEFT_GEN_A_CORE
Patient seen and examined at bedside. Patient denies lightheadedness, dizziness, palpitations, shortness of breath and chest pain. No complaints at this time.     Vital Signs Last 24 Hrs  T(C): 36.7 (29 May 2021 01:55), Max: 37 (28 May 2021 18:18)  T(F): 98.06 (29 May 2021 01:55), Max: 98.6 (28 May 2021 18:18)  HR: 102 (29 May 2021 02:06) (99 - 118)  BP: 123/74 (29 May 2021 01:55) (116/71 - 136/80)  RR: 18 (29 May 2021 01:55) (17 - 18)  SpO2: 98% (29 May 2021 02:11) (98% - 98%)    Gen: NAD  Abd: soft, non-tender  Uterus: firm and below the umbilicus   Pelvic: ~400cc of clots expressed from JESSY     Patient hemodynamically stable. Stable code PPH called for total QBL of 718cc. Cytotec PO given, 1L LR bolus and methergine series to be started. Starting Hgb 14, will repeat in AM.     D/W Dr. Simmons
no

## 2022-05-26 NOTE — PRE-OP CHECKLIST - AS BP NONINV METHOD
electronic
Render In Strict Bullet Format?: No
Detail Level: Zone
Initiate Treatment: Fluorouracil 5% cream BID X 2 weeks to areas of sun damage

## 2022-10-13 NOTE — ED ADULT NURSE NOTE - NSFALLRSKASSESSDT_ED_ALL_ED
06-Nov-2020 08:30 Clofazimine Pregnancy And Lactation Text: This medication is Pregnancy Category C and isn't considered safe during pregnancy. It is excreted in breast milk.

## 2025-01-13 NOTE — PRE-OP CHECKLIST - RESPIRATORY RATE (BREATHS/MIN)
Rx Refill Note  Requested Prescriptions     Pending Prescriptions Disp Refills    midodrine (PROAMATINE) 10 MG tablet [Pharmacy Med Name: Midodrine HCl Oral Tablet 10 MG] 90 tablet 0     Sig: TAKE 1 TABLET BY MOUTH 3 TIMES A DAY      Last office visit with prescribing clinician: 11/20/2023   Last telemedicine visit with prescribing clinician: Visit date not found   Next office visit with prescribing clinician: 1/16/2025                         Would you like a call back once the refill request has been completed: [] Yes [] No    If the office needs to give you a call back, can they leave a voicemail: [] Yes [] No    Maddi Garnica MA  01/13/25, 12:35 EST     18

## 2025-03-13 NOTE — DISCHARGE NOTE OB - FUNCTIONAL STATUS DATE
Mireya called and left a VM, asking to schedule with Taurus WRIGHT, therapist, and Leti MCKENZIE CM, and asking when is her next appt with TONEY Velasquez provider. Per Taurus WRIGHT, she must see Leti first, before scheduling with him, since she has missed appts.    Returned Mireya's call and had to leave a VM. MAT office number provided.    For your review.   30-May-2021

## 2025-07-30 NOTE — ED ADULT TRIAGE NOTE - BMI (KG/M2)
high-fat, processed foods.     Read food labels and try to avoid saturated and trans fats. They increase your risk of heart disease by raising cholesterol levels.     Limit the amount of solid fat--butter, margarine, and shortening--you eat. Use olive, peanut, or canola oil when you cook. Bake, broil, and steam foods instead of frying them.     Eat a variety of fruit and vegetables every day. Dark green, deep orange, red, or yellow fruits and vegetables are especially good for you. Examples include spinach, carrots, peaches, and berries.     Foods high in fiber can reduce your cholesterol and provide important vitamins and minerals. High-fiber foods include whole-grain cereals and breads, oatmeal, beans, brown rice, citrus fruits, and apples.     Eat lean proteins. Heart-healthy proteins include seafood, lean meats and poultry, eggs, beans, peas, nuts, seeds, and soy products.     Limit drinks and foods with added sugar. These include candy, desserts, and soda pop.   Heart-healthy lifestyle    If your doctor recommends it, get more exercise. For many people, walking is a good choice. Or you may want to swim, bike, or do other activities. Bit by bit, increase the time you're active every day. Try for at least 30 minutes on most days of the week.     Try to quit or cut back on using tobacco and other nicotine products. This includes smoking and vaping. If you need help quitting, talk to your doctor about stop-smoking programs and medicines. These can increase your chances of quitting for good. Quitting is one of the most important things you can do to protect your heart. It is never too late to quit. Try to avoid secondhand smoke too.     Stay at a weight that's healthy for you. Talk to your doctor if you need help losing weight.     Try to get 7 to 9 hours of sleep each night.     Limit alcohol to 2 drinks a day for men and 1 drink a day for women. Too much alcohol can cause health problems.     Manage other health  7